# Patient Record
Sex: MALE | Race: WHITE
[De-identification: names, ages, dates, MRNs, and addresses within clinical notes are randomized per-mention and may not be internally consistent; named-entity substitution may affect disease eponyms.]

---

## 2018-05-19 ENCOUNTER — HOSPITAL ENCOUNTER (INPATIENT)
Dept: HOSPITAL 25 - ED | Age: 58
LOS: 3 days | Discharge: HOME | DRG: 754 | End: 2018-05-22
Attending: PSYCHIATRY & NEUROLOGY | Admitting: PSYCHIATRY & NEUROLOGY
Payer: MEDICAID

## 2018-05-19 DIAGNOSIS — F43.21: Primary | ICD-10-CM

## 2018-05-19 LAB
BASOPHILS # BLD AUTO: 0.1 10^3/UL (ref 0–0.2)
EOSINOPHIL # BLD AUTO: 0 10^3/UL (ref 0–0.6)
HCT VFR BLD AUTO: 51 % (ref 42–52)
HGB BLD-MCNC: 17.7 G/DL (ref 14–18)
LYMPHOCYTES # BLD AUTO: 1.4 10^3/UL (ref 1–4.8)
MCH RBC QN AUTO: 33 PG (ref 27–31)
MCHC RBC AUTO-ENTMCNC: 35 G/DL (ref 31–36)
MCV RBC AUTO: 96 FL (ref 80–94)
MONOCYTES # BLD AUTO: 0.9 10^3/UL (ref 0–0.8)
NEUTROPHILS # BLD AUTO: 6.1 10^3/UL (ref 1.5–7.7)
NRBC # BLD AUTO: 0 10^3/UL
NRBC BLD QL AUTO: 0
PLATELET # BLD AUTO: 370 10^3/UL (ref 150–450)
RBC # BLD AUTO: 5.31 10^6/UL (ref 4–5.4)
WBC # BLD AUTO: 8.5 10^3/UL (ref 3.5–10.8)

## 2018-05-19 PROCEDURE — 36415 COLL VENOUS BLD VENIPUNCTURE: CPT

## 2018-05-19 PROCEDURE — 80053 COMPREHEN METABOLIC PANEL: CPT

## 2018-05-19 PROCEDURE — 99283 EMERGENCY DEPT VISIT LOW MDM: CPT

## 2018-05-19 PROCEDURE — 85025 COMPLETE CBC W/AUTO DIFF WBC: CPT

## 2018-05-19 PROCEDURE — 84443 ASSAY THYROID STIM HORMONE: CPT

## 2018-05-19 PROCEDURE — 99232 SBSQ HOSP IP/OBS MODERATE 35: CPT

## 2018-05-19 PROCEDURE — 80061 LIPID PANEL: CPT

## 2018-05-19 PROCEDURE — 80329 ANALGESICS NON-OPIOID 1 OR 2: CPT

## 2018-05-19 PROCEDURE — G0480 DRUG TEST DEF 1-7 CLASSES: HCPCS

## 2018-05-19 PROCEDURE — 90853 GROUP PSYCHOTHERAPY: CPT

## 2018-05-19 PROCEDURE — 81015 MICROSCOPIC EXAM OF URINE: CPT

## 2018-05-19 PROCEDURE — 83036 HEMOGLOBIN GLYCOSYLATED A1C: CPT

## 2018-05-19 PROCEDURE — 80307 DRUG TEST PRSMV CHEM ANLYZR: CPT

## 2018-05-19 PROCEDURE — 99238 HOSP IP/OBS DSCHRG MGMT 30/<: CPT

## 2018-05-19 PROCEDURE — 99222 1ST HOSP IP/OBS MODERATE 55: CPT

## 2018-05-19 PROCEDURE — 80320 DRUG SCREEN QUANTALCOHOLS: CPT

## 2018-05-19 PROCEDURE — 81003 URINALYSIS AUTO W/O SCOPE: CPT

## 2018-05-19 PROCEDURE — 87086 URINE CULTURE/COLONY COUNT: CPT

## 2018-05-19 RX ADMIN — NICOTINE PRN MG: 4 INHALANT RESPIRATORY (INHALATION) at 22:13

## 2018-05-19 RX ADMIN — NICOTINE PRN MG: 4 INHALANT RESPIRATORY (INHALATION) at 09:43

## 2018-05-19 NOTE — ED
Liborio LAST Tenzin, scribed for Esteban Monroe MD on 05/19/18 at 1012 .





Psychiatric Complaint





- HPI Summary


HPI Summary: 


Pt is a 58 years old brought in by police to the ED for a mental health 

evaluation. The patient was a missing person for the last 24 hours. He left all 

his possessions at home and went into the woods with a gun. The police found 

him and brought him in.  He had EtOH yesterday. The patient reports of no 

medical condition. 





- History Of Current Complaint


Chief Complaint: EDMentalHealth


Time Seen by Provider: 05/19/18 08:57


Hx Obtained From: Patient


Onset/Duration: Still Present


Timing: Constant


Severity Initially: Mild


Severity Currently: Mild


Aggravating Factor(s): Nothing


Alleviating Factor(s): Nothing


Has Suicidal: Reports: Demonstrates Gesture





- Allergies/Home Medications


Allergies/Adverse Reactions: 


 Allergies











Allergy/AdvReac Type Severity Reaction Status Date / Time


 


No Known Allergies Allergy   Verified 05/19/18 08:42











Home Medications: 


 Home Medications





NK [No Home Medications Reported]  05/19/18 [History Confirmed 05/19/18]











PMH/Surg Hx/FS Hx/Imm Hx


GI History: Reports: Other GI Disorders - Hernia 


Sensory History: 


   Denies: Hx Legally Blind


EENT History: 


   Denies: Hx Deafness


Psychiatric History: 


   Denies: Hx Depression


Infectious Disease History: No


Infectious Disease History: 


   Denies: Traveled Outside the [place] in Last 30 Days





- Family History


Known Family History: Positive: Other - The pt denies any relevant family 

history. 





- Social History


Occupation: Unemployed


Lives: Alone


Alcohol Use: Occasionally





Review of Systems


Negative: Skin Diaphoresis


Negative: Erythema


Psychological: Other - demonstrated gesture of SI


All Other Systems Reviewed And Are Negative: Yes





Physical Exam





- Summary


Physical Exam Summary: 


Appearance: The patient is well-nourished in no acute distress and in no acute 

pain.


 


Skin: The skin is warm and dry and skin color reflects adequate perfusion.


 


HEENT: The head is normocephalic and atraumatic. The pupils are equal and 

reactive. The conjunctivae are clear and without drainage. Nares are patent and 

without drainage. Mouth reveals moist mucous membranes and the throat is 

without erythema and exudate. The external ears are intact. The ear canals are 

patent and without drainage. The tympanic membranes are intact.


 


Neck: the neck is supple with full range of motion and non-tender. There are no 

carotid bruits. There is no neck vein distension.


 


Respiratory: Chest is non-tender. Lungs are clear to auscultation and breath 

sounds are symmetrical and equal.


 


Cardiovascular: Heart is regular rate and rhythm. There is no murmur or rub 

auscultated. There is no peripheral edema and pulses are symmetrical and equal.


 


Abdomen: The abdomen is soft and non-tender. There are normal bowel sounds 

heard in all four quadrants and there is no organomegaly palpated.


 


Musculoskeletal: There is no back tenderness noted. Extremities are non-tender 

with full range of motion. There is good capillary refill. There is no 

peripheral edema or calf tenderness elicited.


 


Neurological: Patient is alert and oriented to person, place and time. The 

patient has symmetrical motor strength in all four extremities. Cranial nerves 

are grossly intact. Deep tendon reflexes are symmetrical and equal in all four 

extremities.


 


Psychiatric: The patient has an appropriate affect and does not exhibit any 

anxiety or depression.








Triage Information Reviewed: Yes


Vital Signs On Initial Exam: 


 Initial Vitals











Temp Pulse Resp BP Pulse Ox


 


 96.1 F   103   16   128/93   96 


 


 05/19/18 08:42  05/19/18 08:42  05/19/18 08:42  05/19/18 08:42  05/19/18 08:42











Vital Signs Reviewed: Yes





Diagnostics





- Vital Signs


 Vital Signs











  Temp Pulse Resp BP Pulse Ox


 


 05/19/18 08:42  96.1 F  103  16  128/93  96














- Laboratory


Lab Results: 


 Lab Results











  05/19/18 05/19/18 Range/Units





  09:10 09:10 


 


WBC  8.5   (3.5-10.8)  10^3/ul


 


RBC  5.31   (4.0-5.4)  10^6/ul


 


Hgb  17.7   (14.0-18.0)  g/dl


 


Hct  51   (42-52)  %


 


MCV  96 H   (80-94)  fL


 


MCH  33 H   (27-31)  pg


 


MCHC  35   (31-36)  g/dl


 


RDW  14   (10.5-15)  %


 


Plt Count  370   (150-450)  10^3/ul


 


MPV  7.4   (7.4-10.4)  um3


 


Neut % (Auto)  71.7   (38-83)  %


 


Lymph % (Auto)  17.0 L   (25-47)  %


 


Mono % (Auto)  10.2 H   (0-7)  %


 


Eos % (Auto)  0.4   (0-6)  %


 


Baso % (Auto)  0.7   (0-2)  %


 


Absolute Neuts (auto)  6.1   (1.5-7.7)  10^3/ul


 


Absolute Lymphs (auto)  1.4   (1.0-4.8)  10^3/ul


 


Absolute Monos (auto)  0.9 H   (0-0.8)  10^3/ul


 


Absolute Eos (auto)  0   (0-0.6)  10^3/ul


 


Absolute Basos (auto)  0.1   (0-0.2)  10^3/ul


 


Absolute Nucleated RBC  0   10^3/ul


 


Nucleated RBC %  0   


 


Sodium   143  (139-145)  mmol/L


 


Potassium   3.5  (3.5-5.0)  mmol/L


 


Chloride   106  (101-111)  mmol/L


 


Carbon Dioxide   27  (22-32)  mmol/L


 


Anion Gap   10  (2-11)  mmol/L


 


BUN   8  (6-24)  mg/dL


 


Creatinine   0.82  (0.67-1.17)  mg/dL


 


Est GFR ( Amer)   124.5  (>60)  


 


Est GFR (Non-Af Amer)   96.8  (>60)  


 


BUN/Creatinine Ratio   9.8  (8-20)  


 


Glucose   82  ()  mg/dL


 


Calcium   9.8  (8.6-10.3)  mg/dL


 


Total Bilirubin   0.40  (0.2-1.0)  mg/dL


 


AST   28  (13-39)  U/L


 


ALT   21  (7-52)  U/L


 


Alkaline Phosphatase   54  ()  U/L


 


Total Protein   8.1  (6.4-8.9)  g/dL


 


Albumin   4.9  (3.2-5.2)  g/dL


 


Globulin   3.2  (2-4)  g/dL


 


Albumin/Globulin Ratio   1.5  (1-3)  


 


TSH   Pending  


 


Salicylates   Pending  


 


Acetaminophen   Pending  


 


Serum Alcohol   Pending  











Result Diagrams: 


 05/19/18 09:10





 05/19/18 09:10


Lab Statement: Any lab studies that have been ordered have been reviewed, and 

results considered in the medical decision making process.





Course/Dx





- Course


Course Of Treatment: Mr. Barry still had a bit of ETOH in his system but was 

medically cleared after an hour and had a MHE.  They felt that he qualified for 

a 939 admission and I agreed as I was concerned for his safety.





- Differential Dx/Clinical Impression


Provider Diagnosis: 


 Suicidal ideation, Depression








- Physician Notifications


Discussed Care Of Patient With: Abby Horner


Time Discussed With Above Provider: 14:00


Instructed by Provider To: Admit As Inpatient





Discharge





- Sign-Out/Discharge


Documenting (check all that apply): Discharge/Admit/Transfer





- Discharge Plan


Condition: Good


Disposition: [place]YCHIATRIC FAC[place] [place]





- Billing Disposition and Condition


Condition: GOOD


Disposition: PSY-[facility]





The documentation as recorded by the Liborio bailon Tenzin accurately reflects 

the service I personally performed and the decisions made by me, Esteban Monroe MD.

## 2018-05-20 RX ADMIN — NICOTINE PRN MG: 4 INHALANT RESPIRATORY (INHALATION) at 09:14

## 2018-05-20 RX ADMIN — NICOTINE PRN MG: 4 INHALANT RESPIRATORY (INHALATION) at 19:15

## 2018-05-20 RX ADMIN — NICOTINE PRN MG: 4 INHALANT RESPIRATORY (INHALATION) at 13:35

## 2018-05-20 RX ADMIN — THERA TABS SCH TAB: TAB at 09:14

## 2018-05-20 RX ADMIN — NICOTINE PRN MG: 4 INHALANT RESPIRATORY (INHALATION) at 22:14
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
## 2018-05-21 RX ADMIN — NICOTINE PRN MG: 4 INHALANT RESPIRATORY (INHALATION) at 18:00

## 2018-05-21 RX ADMIN — NICOTINE PRN MG: 4 INHALANT RESPIRATORY (INHALATION) at 09:09

## 2018-05-21 RX ADMIN — NICOTINE PRN MG: 4 INHALANT RESPIRATORY (INHALATION) at 22:42

## 2018-05-21 RX ADMIN — THERA TABS SCH TAB: TAB at 09:09
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
## 2018-05-22 VITALS — DIASTOLIC BLOOD PRESSURE: 69 MMHG | SYSTOLIC BLOOD PRESSURE: 128 MMHG

## 2018-05-22 RX ADMIN — THERA TABS SCH: TAB at 10:28

## 2018-05-22 NOTE — PN
MHU: Group Therapy Note





- Service Type


Service Type: 29748 Group Psychotherapy - Cognitive Behavioral Group Therapy (

CBT):Patient was attentive and participatory in CBT programming this morning, 

and remained in good behavioral control.  Patient expressed positive insights 

regarding relevant treatment interventions and goals.

## 2018-05-24 NOTE — DS
DISCHARGE SUMMARY:

 

DATE OF ADMISSION:  05/19/18

 

DATE OF DISCHARGE:  05/22/18

 

PROVIDER:  Mary Jane Chen NP, in Psychiatry.

 

SUPERVISING PHYSICIAN:  Dr. Juma Lu.* (DICTATED BY MARY JANE CHEN NP
)

 

DIAGNOSES: 

Axis  I:  Adjustment disorder with depressive and anxious features. 

Axis II:  Cluster B traits.

 

CONDITION AT THE TIME OF DISCHARGE:  Arjun has improved.  He is 
psychiatrically cleared.  He is stable.  He participated in some groups and was 
very social with pears.  His girlfriend is agreeable to discharge.  He has done 
well here psychiatrically.  He declined to start medication.

 

MENTAL STATUS EXAMINATION:  At the time of discharge, the patient is calm, 
cooperative, his eye contact is very good.  He is alert and oriented x3.  His 
grooming is adequate.  His speech pace is normal.  Thought processes are 
logical. He is not psychotic, not delusional.  Denies AH, VH, SI, and HI.  
Insight and judgment are fair to good.  He is reluctantly willing to follow up 
with [facility].

 

DISCHARGE INSTRUCTIONS TO THE PATIENT: 

A.  Medications:  No medications were started and he was taking none when he 
arrived.

 

B.  Diet is regular.

 

C.  Activities as tolerated.  Tobacco cessation materials were offered to 
Arjun. There are no studies pending at the time of discharge.

 

D.  Followup care appointments with Mental Health in [facility] are made 
and he has no primary care doctor at this time.

 

E.  Substance abuse followup is not indicated.

 

HOSPITAL COURSE:  Part-A:  This is a 58-year-old male with no prior history of 
treatments for mental illness or even physical health, was admitted yesterday 
due to concern about his safety during the last 24 hours.  He was found in the 
[place] wandering around by the law enforcement following a report by his 
girlfriend that he was missing and left the house with a gun in his possession.
  Arjun reports that recently he has been extremely frustrated over his life 
that he could not find any meaningful job and other relationship that was 
happening in his life.  Although he reports that he feels sad and frustrated, 
he denies any extended period when he remains depressed, isolates himself, 
feels helpless, hopeless, or worthless.  He rather finds himself to be goal 
oriented, full of energy, and all he wants to do is something either for 
himself or to help his girlfriend out.  He just recently got frustrated over 
his life being here, not finding a job and being productive. Apparently, 
Arjun had a very colorful life story to tell including venturing out of 
[place], going into [place] and then to [place] and finally immigrating to the 
[place].  He vehemently denies experiencing any psychotic 
symptoms, manic or hypomanic symptoms, or being suicidal at this time.  The 
night before last, he had that thought that he would be better off and others 
around him will be better off if he was dead.  At one time in his life, he saw 
one of his friends shoot himself in the head and kill himself.  He thought he 
could do it too, but he backed off.  At darkness of the night, he dug a hole in 
the ground somewhere in the woods and buried the gun over there.  Next morning, 
he did not even find out where he put the gun as he was intoxicated and it was 
too dark around at that time.

 

Part-B:  Psychiatric treatment was rendered.  Arjun declined medications to 
be started as he states he never feels bad for long periods of time.  When we 
discussed that he had a very alarming journey into the dark in the woods, he 
minimized that in some ways and also acknowledged that it would be frightening 
for other people.  He states that he now realizes he have choices he can make 
that are better such as calling a friend, changing his _____ plans that he can 
afford it, getting any old job that he could get in, just be busy and 
potentially leave the area and go to either [place] to visit friends or 
[facility].  Arjun was admitted to the adult behavioral health unit and placed 
on 15-minute checks for safety.  He did well on the unit and he went to some 
groups.  He interacted with peers well and played chess often.  I did phone his 
girlfriend, Latonya, and she is concerned about him because she feels like he is 
bored and he would ordinarily have left the area, but he fell in love with her.
  No consults were entered.  He is improved.  He has no desire to harm himself 
at this point and believes he could benefit from some outpatient treatment, at 
least 1 or 2 visits.

 

____________________________________ MARY JANE CHEN, NP

 

[id number]/CPS #: [id number]

[facility] CenterNOAH

## 2021-11-09 ENCOUNTER — TELEPHONE (OUTPATIENT)
Dept: SCHEDULING | Age: 61
End: 2021-11-09

## 2021-11-10 ENCOUNTER — TELEPHONE (OUTPATIENT)
Dept: SCHEDULING | Age: 61
End: 2021-11-10

## 2021-11-11 ENCOUNTER — APPOINTMENT (OUTPATIENT)
Dept: INTERNAL MEDICINE | Age: 61
End: 2021-11-11

## 2021-11-11 ENCOUNTER — TELEPHONE (OUTPATIENT)
Dept: SCHEDULING | Age: 61
End: 2021-11-11

## 2021-11-11 ENCOUNTER — OFFICE VISIT (OUTPATIENT)
Dept: INTERNAL MEDICINE | Age: 61
End: 2021-11-11

## 2021-11-11 ENCOUNTER — LAB SERVICES (OUTPATIENT)
Dept: LAB | Age: 61
End: 2021-11-11

## 2021-11-11 VITALS
HEART RATE: 76 BPM | DIASTOLIC BLOOD PRESSURE: 84 MMHG | OXYGEN SATURATION: 98 % | SYSTOLIC BLOOD PRESSURE: 128 MMHG | BODY MASS INDEX: 19.35 KG/M2 | RESPIRATION RATE: 14 BRPM | WEIGHT: 130.62 LBS | HEIGHT: 69 IN

## 2021-11-11 DIAGNOSIS — Z12.11 SCREENING FOR COLON CANCER: ICD-10-CM

## 2021-11-11 DIAGNOSIS — L40.9 PSORIASIS: ICD-10-CM

## 2021-11-11 DIAGNOSIS — Z00.00 HEALTHCARE MAINTENANCE: ICD-10-CM

## 2021-11-11 DIAGNOSIS — Z00.00 HEALTHCARE MAINTENANCE: Primary | ICD-10-CM

## 2021-11-11 PROCEDURE — 36415 COLL VENOUS BLD VENIPUNCTURE: CPT | Performed by: PSYCHIATRY & NEUROLOGY

## 2021-11-11 PROCEDURE — 80061 LIPID PANEL: CPT | Performed by: PSYCHIATRY & NEUROLOGY

## 2021-11-11 PROCEDURE — 83036 HEMOGLOBIN GLYCOSYLATED A1C: CPT | Performed by: PSYCHIATRY & NEUROLOGY

## 2021-11-11 PROCEDURE — 80048 BASIC METABOLIC PNL TOTAL CA: CPT | Performed by: PSYCHIATRY & NEUROLOGY

## 2021-11-11 PROCEDURE — 85025 COMPLETE CBC W/AUTO DIFF WBC: CPT | Performed by: PSYCHIATRY & NEUROLOGY

## 2021-11-11 PROCEDURE — 99386 PREV VISIT NEW AGE 40-64: CPT | Performed by: STUDENT IN AN ORGANIZED HEALTH CARE EDUCATION/TRAINING PROGRAM

## 2021-11-11 ASSESSMENT — PATIENT HEALTH QUESTIONNAIRE - PHQ9
2. FEELING DOWN, DEPRESSED OR HOPELESS: NOT AT ALL
CLINICAL INTERPRETATION OF PHQ9 SCORE: NO FURTHER SCREENING NEEDED
SUM OF ALL RESPONSES TO PHQ9 QUESTIONS 1 AND 2: 0
SUM OF ALL RESPONSES TO PHQ9 QUESTIONS 1 AND 2: 0
1. LITTLE INTEREST OR PLEASURE IN DOING THINGS: NOT AT ALL
CLINICAL INTERPRETATION OF PHQ2 SCORE: NO FURTHER SCREENING NEEDED
SUM OF ALL RESPONSES TO PHQ9 QUESTIONS 1 AND 2: 0

## 2021-11-11 ASSESSMENT — ENCOUNTER SYMPTOMS
CHILLS: 0
VOMITING: 0
FEVER: 0
HEADACHES: 0
COUGH: 0
SHORTNESS OF BREATH: 0
NAUSEA: 0

## 2021-11-12 LAB
ANION GAP SERPL CALC-SCNC: 13 MMOL/L (ref 10–20)
BASOPHILS # BLD: 0.1 K/MCL (ref 0–0.3)
BASOPHILS NFR BLD: 1 %
BUN SERPL-MCNC: 14 MG/DL (ref 6–20)
BUN/CREAT SERPL: 18 (ref 7–25)
CALCIUM SERPL-MCNC: 9.6 MG/DL (ref 8.4–10.2)
CHLORIDE SERPL-SCNC: 102 MMOL/L (ref 98–107)
CHOLEST SERPL-MCNC: 207 MG/DL
CHOLEST/HDLC SERPL: 3.8 {RATIO}
CO2 SERPL-SCNC: 28 MMOL/L (ref 21–32)
CREAT SERPL-MCNC: 0.78 MG/DL (ref 0.67–1.17)
DEPRECATED RDW RBC: 44.5 FL (ref 39–50)
EOSINOPHIL # BLD: 0.1 K/MCL (ref 0–0.5)
EOSINOPHIL NFR BLD: 2 %
ERYTHROCYTE [DISTWIDTH] IN BLOOD: 12.8 % (ref 11–15)
FASTING DURATION TIME PATIENT: 2 HOURS (ref 0–999)
FASTING DURATION TIME PATIENT: ABNORMAL H
GFR SERPLBLD BASED ON 1.73 SQ M-ARVRAT: >90 ML/MIN
GLUCOSE SERPL-MCNC: 79 MG/DL (ref 70–99)
HBA1C MFR BLD: 5.2 % (ref 4.5–5.6)
HCT VFR BLD CALC: 49 % (ref 39–51)
HDLC SERPL-MCNC: 54 MG/DL
HGB BLD-MCNC: 15.7 G/DL (ref 13–17)
IMM GRANULOCYTES # BLD AUTO: 0 K/MCL (ref 0–0.2)
IMM GRANULOCYTES # BLD: 0 %
LDLC SERPL CALC-MCNC: 124 MG/DL
LYMPHOCYTES # BLD: 1.7 K/MCL (ref 1–4)
LYMPHOCYTES NFR BLD: 19 %
MCH RBC QN AUTO: 30.3 PG (ref 26–34)
MCHC RBC AUTO-ENTMCNC: 32 G/DL (ref 32–36.5)
MCV RBC AUTO: 94.4 FL (ref 78–100)
MONOCYTES # BLD: 0.6 K/MCL (ref 0.3–0.9)
MONOCYTES NFR BLD: 7 %
NEUTROPHILS # BLD: 6.2 K/MCL (ref 1.8–7.7)
NEUTROPHILS NFR BLD: 71 %
NONHDLC SERPL-MCNC: 153 MG/DL
NRBC BLD MANUAL-RTO: 0 /100 WBC
PLATELET # BLD AUTO: 391 K/MCL (ref 140–450)
POTASSIUM SERPL-SCNC: 4.3 MMOL/L (ref 3.4–5.1)
RBC # BLD: 5.19 MIL/MCL (ref 4.5–5.9)
SODIUM SERPL-SCNC: 139 MMOL/L (ref 135–145)
TRIGL SERPL-MCNC: 146 MG/DL
WBC # BLD: 8.7 K/MCL (ref 4.2–11)

## 2022-02-16 ENCOUNTER — TELEPHONE (OUTPATIENT)
Dept: SCHEDULING | Age: 62
End: 2022-02-16

## 2022-03-28 ENCOUNTER — EXTERNAL RECORD (OUTPATIENT)
Dept: HEALTH INFORMATION MANAGEMENT | Facility: OTHER | Age: 62
End: 2022-03-28

## 2023-02-20 ENCOUNTER — OFFICE VISIT (OUTPATIENT)
Dept: INTERNAL MEDICINE | Age: 63
End: 2023-02-20

## 2023-02-20 ENCOUNTER — IMAGING SERVICES (OUTPATIENT)
Dept: GENERAL RADIOLOGY | Age: 63
End: 2023-02-20
Attending: INTERNAL MEDICINE

## 2023-02-20 VITALS
SYSTOLIC BLOOD PRESSURE: 114 MMHG | WEIGHT: 143.3 LBS | HEART RATE: 74 BPM | BODY MASS INDEX: 21.22 KG/M2 | DIASTOLIC BLOOD PRESSURE: 72 MMHG | RESPIRATION RATE: 17 BRPM | HEIGHT: 69 IN | OXYGEN SATURATION: 97 %

## 2023-02-20 DIAGNOSIS — M25.561 ACUTE PAIN OF RIGHT KNEE: Primary | ICD-10-CM

## 2023-02-20 DIAGNOSIS — M25.561 ACUTE PAIN OF RIGHT KNEE: ICD-10-CM

## 2023-02-20 PROCEDURE — 99213 OFFICE O/P EST LOW 20 MIN: CPT | Performed by: STUDENT IN AN ORGANIZED HEALTH CARE EDUCATION/TRAINING PROGRAM

## 2023-02-20 PROCEDURE — 73560 X-RAY EXAM OF KNEE 1 OR 2: CPT | Performed by: INTERNAL MEDICINE

## 2023-02-20 RX ORDER — TACROLIMUS 0.3 MG/G
OINTMENT TOPICAL
COMMUNITY
Start: 2023-01-30

## 2023-02-20 RX ORDER — CLOBETASOL PROPIONATE 0.5 MG/G
OINTMENT TOPICAL
COMMUNITY
Start: 2022-12-23

## 2023-02-20 RX ORDER — OLANZAPINE 15 MG/1
TABLET, ORALLY DISINTEGRATING ORAL
COMMUNITY
Start: 2023-01-30

## 2023-02-20 RX ORDER — IBUPROFEN 600 MG/1
600 TABLET ORAL 3 TIMES DAILY
Qty: 42 TABLET | Refills: 0 | Status: SHIPPED | OUTPATIENT
Start: 2023-02-20 | End: 2023-03-06

## 2023-02-20 ASSESSMENT — ENCOUNTER SYMPTOMS
ABDOMINAL PAIN: 0
HEADACHES: 0
COUGH: 0
VOMITING: 0
NAUSEA: 0
FEVER: 0
CHILLS: 0
SHORTNESS OF BREATH: 0

## 2023-02-20 ASSESSMENT — PATIENT HEALTH QUESTIONNAIRE - PHQ9
2. FEELING DOWN, DEPRESSED OR HOPELESS: SEVERAL DAYS
SUM OF ALL RESPONSES TO PHQ9 QUESTIONS 1 AND 2: 2
SUM OF ALL RESPONSES TO PHQ9 QUESTIONS 1 AND 2: 2
CLINICAL INTERPRETATION OF PHQ2 SCORE: NO FURTHER SCREENING NEEDED
1. LITTLE INTEREST OR PLEASURE IN DOING THINGS: SEVERAL DAYS

## 2023-02-20 ASSESSMENT — PAIN SCALES - GENERAL: PAINLEVEL: 2

## 2023-05-30 ENCOUNTER — TELEPHONE (OUTPATIENT)
Dept: INTERNAL MEDICINE | Age: 63
End: 2023-05-30

## 2023-05-30 DIAGNOSIS — Z12.11 SCREENING FOR COLORECTAL CANCER: Primary | ICD-10-CM

## 2023-05-30 DIAGNOSIS — Z12.12 SCREENING FOR COLORECTAL CANCER: Primary | ICD-10-CM

## 2023-06-19 ENCOUNTER — TELEPHONE (OUTPATIENT)
Dept: INTERNAL MEDICINE | Age: 63
End: 2023-06-19

## 2023-10-21 DIAGNOSIS — Z12.11 SCREENING FOR COLON CANCER: Primary | ICD-10-CM

## 2023-10-24 ENCOUNTER — TELEPHONE (OUTPATIENT)
Dept: INTERNAL MEDICINE | Age: 63
End: 2023-10-24

## 2023-11-21 ENCOUNTER — TELEPHONE (OUTPATIENT)
Dept: INTERNAL MEDICINE | Age: 63
End: 2023-11-21